# Patient Record
Sex: FEMALE | Race: OTHER
[De-identification: names, ages, dates, MRNs, and addresses within clinical notes are randomized per-mention and may not be internally consistent; named-entity substitution may affect disease eponyms.]

---

## 2019-04-16 ENCOUNTER — HOSPITAL ENCOUNTER (EMERGENCY)
Dept: HOSPITAL 89 - ER | Age: 24
Discharge: HOME | End: 2019-04-16
Payer: COMMERCIAL

## 2019-04-16 VITALS — SYSTOLIC BLOOD PRESSURE: 148 MMHG | DIASTOLIC BLOOD PRESSURE: 105 MMHG

## 2019-04-16 DIAGNOSIS — F91.9: Primary | ICD-10-CM

## 2019-04-16 LAB — PLATELET COUNT, AUTOMATED: 291 K/UL (ref 150–450)

## 2019-04-16 PROCEDURE — 82040 ASSAY OF SERUM ALBUMIN: CPT

## 2019-04-16 PROCEDURE — 85025 COMPLETE CBC W/AUTO DIFF WBC: CPT

## 2019-04-16 PROCEDURE — 84443 ASSAY THYROID STIM HORMONE: CPT

## 2019-04-16 PROCEDURE — 83735 ASSAY OF MAGNESIUM: CPT

## 2019-04-16 PROCEDURE — 84703 CHORIONIC GONADOTROPIN ASSAY: CPT

## 2019-04-16 PROCEDURE — 80320 DRUG SCREEN QUANTALCOHOLS: CPT

## 2019-04-16 PROCEDURE — 84075 ASSAY ALKALINE PHOSPHATASE: CPT

## 2019-04-16 PROCEDURE — 82565 ASSAY OF CREATININE: CPT

## 2019-04-16 PROCEDURE — 82374 ASSAY BLOOD CARBON DIOXIDE: CPT

## 2019-04-16 PROCEDURE — 99283 EMERGENCY DEPT VISIT LOW MDM: CPT

## 2019-04-16 PROCEDURE — 84295 ASSAY OF SERUM SODIUM: CPT

## 2019-04-16 PROCEDURE — 84460 ALANINE AMINO (ALT) (SGPT): CPT

## 2019-04-16 PROCEDURE — 82310 ASSAY OF CALCIUM: CPT

## 2019-04-16 PROCEDURE — 84520 ASSAY OF UREA NITROGEN: CPT

## 2019-04-16 PROCEDURE — 84450 TRANSFERASE (AST) (SGOT): CPT

## 2019-04-16 PROCEDURE — 80329 ANALGESICS NON-OPIOID 1 OR 2: CPT

## 2019-04-16 PROCEDURE — 84132 ASSAY OF SERUM POTASSIUM: CPT

## 2019-04-16 PROCEDURE — 84155 ASSAY OF PROTEIN SERUM: CPT

## 2019-04-16 PROCEDURE — 82435 ASSAY OF BLOOD CHLORIDE: CPT

## 2019-04-16 PROCEDURE — 82247 BILIRUBIN TOTAL: CPT

## 2019-04-16 PROCEDURE — 82947 ASSAY GLUCOSE BLOOD QUANT: CPT

## 2019-04-16 NOTE — ER REPORT
History and Physical


Time Seen By MD:  16:14


HPI/ROS


CHIEF COMPLAINT: Bad emotions





HISTORY OF PRESENT ILLNESS: Pt states that she has bad emotions each year at 


spring time, "I am angry, upset, anxious, frustrated and sad". States that no 


one understands her "they cant feel my emotions so how can they understand".  Pt


states she has had these feelings each year around spring time and they seem to 


go away afterwards. Pt states it has been going on for 3 years "ever since they 


put me on an antibiotic 3 yrs ago this has been happening to me". PT denies 


suicidal thoughts. Pt denies homicidal thoughts.  Pt states she used to be on an


antidepressent years ago but stopped it because it was not working.Pt does not 


know the name of the antidepressent. Pt states she does not see a therapist 


because she does not have money to due so. 





REVIEW OF SYSTEMS:


Constitutional: No fever, no chills.


Eyes: No discharge.


ENT: No sore throat, + chronic runny nose in spring


Cardiovascular: No chest pain, no palpitations.


Respiratory: No cough, no shortness of breath.


Gastrointestinal: No abdominal pain, no vomiting.


Genitourinary: No hematuria.


Musculoskeletal: No back pain.


Skin: No rashes.


Neurological: No headache.


Psych: +depressed, +anxious


Allergies:  


Coded Allergies:  


     No Known Drug Allergies (Unverified , 3/17/17)


Home Meds


No Active Prescriptions or Reported Meds


Past Medical/Surgical History


Pmhx: emotional disorder


Pshx: noncontribuitory


Smoking Status:  Never Smoker


Hx Alcohol Use:  Yes


Constitutional





Vital Sign - Last 24 Hours








 4/16/19 4/16/19 4/16/19 4/16/19





 16:26 16:26 16:33 17:03


 


Pulse 97  112 95


 


Resp 14   


 


B/P (MAP)  135/104 (114)  


 


Pulse Ox 91  95 97


 


O2 Delivery Room Air   





 4/16/19 4/16/19 4/16/19 





 17:08 17:38 17:44 


 


Pulse 87 92  


 


B/P (MAP)   148/105 (119) 


 


Pulse Ox 94 93  








Physical Exam


General Appearance: The patient is alert, has no immediate need for airway 


protection and no signs of toxicity.


Eyes: Pupils equal and round no pallor or injection, EOMI


ENT:  no pharyngeal erythema or exudates, Mucous membranes are moist


Respiratory: There are no retractions, lungs are clear to auscultation.


Cardiovascular: Regular rate and rhythm. pulses are equal and symmetrical


Gastrointestinal:  Abdomen is soft and non tender, no masses, bowel sounds 


normal, no guarding, no rigidity or rebound


Neurological: Cranial nerves II-XII grossly intact, no sensory or motor loss


Skin: Warm and dry, no rashes.


Musculoskeletal: Neck is supple non tender, no vertebral tenderness


Extremities are non tender, nonswollen and have full range of motion.


Psych: + anxious








DIFFERENTIAL DIAGNOSIS: After history and physical exam differential diagnosis 


was considered for bipolar, depression, mood disorder





Medical Decision Making


Data Points


Result Diagram:  


4/16/19 1647                                                                    


           4/16/19 1647





Laboratory





Hematology








Test


 4/16/19


00:00 4/16/19


16:47


 


Red Blood Count


 


 5.34 M/uL


(4.17-5.56)


 


Mean Corpuscular Volume


 


 92.2 fL


(80.0-96.0)


 


Mean Corpuscular Hemoglobin


 


 31.8 pg


(26.0-33.0)


 


Mean Corpuscular Hemoglobin


Concent 


 34.5 g/dL


(32.0-36.0)


 


Red Cell Distribution Width


 


 13.2 %


(11.5-14.5)


 


Mean Platelet Volume


 


 7.8 fL


(7.2-11.1)


 


Neutrophils (%) (Auto)


 


 76.0 %


(39.4-72.5)


 


Lymphocytes (%) (Auto)


 


 12.7 %


(17.6-49.6)


 


Monocytes (%) (Auto)


 


 10.8 %


(4.1-12.4)


 


Eosinophils (%) (Auto)


 


 0.1 %


(0.4-6.7)


 


Basophils (%) (Auto)


 


 0.4 %


(0.3-1.4)


 


Nucleated RBC Relative Count


(auto) 


 0.0 /100WBC 





 


Neutrophils # (Auto)


 


 8.2 K/uL


(2.0-7.4)


 


Lymphocytes # (Auto)


 


 1.4 K/uL


(1.3-3.6)


 


Monocytes # (Auto)


 


 1.2 K/uL


(0.3-1.0)


 


Eosinophils # (Auto)


 


 0.0 K/uL


(0.0-0.5)


 


Basophils # (Auto)


 


 0.0 K/uL


(0.0-0.1)


 


Nucleated RBC Absolute Count


(auto) 


 0.00 K/uL 





 


Sodium Level


 


 144 mmol/L


(137-145)


 


Potassium Level


 


 3.9 mmol/L


(3.5-5.0)


 


Chloride Level


 


 109 mmol/L


()


 


Carbon Dioxide Level


 


 22 mmol/L


(22-31)


 


Blood Urea Nitrogen


 


 12 mg/dl


(7-18)


 


Creatinine


 


 0.70 mg/dl


(0.52-1.04)


 


Glomerular Filtration Rate


Calc 


 > 60.0 





 


Random Glucose


 


 118 mg/dl


()


 


Calcium Level


 


 10.1 mg/dl


(8.4-10.2)


 


Magnesium Level


 


 2.1 mg/dl


(1.7-2.2)


 


Total Bilirubin


 


 0.7 mg/dl


(0.2-1.3)


 


Aspartate Amino Transf


(AST/SGOT) 


 20 U/L (0-35) 





 


Alanine Aminotransferase


(ALT/SGPT) 


 24 U/L (0-56) 





 


Alkaline Phosphatase  57 U/L (0-126) 


 


Total Protein


 


 8.4 g/dl


(6.3-8.2)


 


Albumin


 


 5.4 g/dl


(3.5-5.0)


 


Salicylates Level  < 10 mg/L 


 


Salicylate Last Dose Date  unknown 


 


Acetaminophen Level  < 10 ug/ml 


 


Serum Alcohol  < 10 mg/dl 








Chemistry








Test


 4/16/19


00:00 4/16/19


16:47


 


White Blood Count


 


 10.8 k/uL


(4.5-11.0)


 


Red Blood Count


 


 5.34 M/uL


(4.17-5.56)


 


Hemoglobin


 


 17.0 g/dL


(12.0-16.0)


 


Hematocrit


 


 49.3 %


(34.0-47.0)


 


Mean Corpuscular Volume


 


 92.2 fL


(80.0-96.0)


 


Mean Corpuscular Hemoglobin


 


 31.8 pg


(26.0-33.0)


 


Mean Corpuscular Hemoglobin


Concent 


 34.5 g/dL


(32.0-36.0)


 


Red Cell Distribution Width


 


 13.2 %


(11.5-14.5)


 


Platelet Count


 


 291 K/uL


(150-450)


 


Mean Platelet Volume


 


 7.8 fL


(7.2-11.1)


 


Neutrophils (%) (Auto)


 


 76.0 %


(39.4-72.5)


 


Lymphocytes (%) (Auto)


 


 12.7 %


(17.6-49.6)


 


Monocytes (%) (Auto)


 


 10.8 %


(4.1-12.4)


 


Eosinophils (%) (Auto)


 


 0.1 %


(0.4-6.7)


 


Basophils (%) (Auto)


 


 0.4 %


(0.3-1.4)


 


Nucleated RBC Relative Count


(auto) 


 0.0 /100WBC 





 


Neutrophils # (Auto)


 


 8.2 K/uL


(2.0-7.4)


 


Lymphocytes # (Auto)


 


 1.4 K/uL


(1.3-3.6)


 


Monocytes # (Auto)


 


 1.2 K/uL


(0.3-1.0)


 


Eosinophils # (Auto)


 


 0.0 K/uL


(0.0-0.5)


 


Basophils # (Auto)


 


 0.0 K/uL


(0.0-0.1)


 


Nucleated RBC Absolute Count


(auto) 


 0.00 K/uL 





 


Glomerular Filtration Rate


Calc 


 > 60.0 





 


Calcium Level


 


 10.1 mg/dl


(8.4-10.2)


 


Magnesium Level


 


 2.1 mg/dl


(1.7-2.2)


 


Total Bilirubin


 


 0.7 mg/dl


(0.2-1.3)


 


Aspartate Amino Transf


(AST/SGOT) 


 20 U/L (0-35) 





 


Alanine Aminotransferase


(ALT/SGPT) 


 24 U/L (0-56) 





 


Alkaline Phosphatase  57 U/L (0-126) 


 


Total Protein


 


 8.4 g/dl


(6.3-8.2)


 


Albumin


 


 5.4 g/dl


(3.5-5.0)


 


Salicylates Level  < 10 mg/L 


 


Salicylate Last Dose Date  unknown 


 


Acetaminophen Level  < 10 ug/ml 


 


Serum Alcohol  < 10 mg/dl 








Toxicology








Test


 4/16/19


16:47


 


Salicylates Level < 10 mg/L 


 


Salicylate Last Dose Date unknown 


 


Acetaminophen Level < 10 ug/ml 


 


Serum Alcohol < 10 mg/dl 








Urinalysis








Test


 4/16/19


00:00











ED Course/Re-evaluation


ED Course


check labs and discuss with S





 04/16/2019 5:45:50 pm Pt seen by [facility] and does not want to be admitted. Pt is 


not homicidal or suicidal.  PT given numbers for follow up with therapist as out


patient. 








 04/16/2019 6:00:55 pm PT requesting a numbner to call tomorrow to obtain her 


tsh levels.  I did let her know of the patient portal however will also give her


the emergency room numbner.


Decision to Disposition Date:  Apr 16, 2019


Decision to Disposition Time:  17:50





Depart


Departure


Latest Vital Signs





Vital Signs








  Date Time  Temp Pulse Resp B/P (MAP) Pulse Ox O2 Delivery O2 Flow Rate FiO2


 


4/16/19 17:44    148/105 (119)    


 


4/16/19 17:38  92   93   


 


4/16/19 16:26   14   Room Air  








Impression:  


   Primary Impression:  


   Behavioral disorder


Condition:  Condition Unchanged


Disposition:  HOME OR SELF-CARE


Referrals:  


CONSTANTIN PEREZ MD (PCP)











[facility]


5 Days


New Scripts


No Active Prescriptions or Reported Meds


Patient Instructions:  GENERAL ER DISCHARGE INSTRUCTIONS





Additional Instructions:  


It is important that you follow up with a therapist to help you with your 


emotions. 


If at anytime you feel unsafe and feel you need to be admitted to the behavioral


health unit then please return to the emergency department.  





Your thyroid study will not be back until tomorrow. We will call you if  your 


thyroid levels are abnormal.  Emergency room kei [phone]











TALISHA COLVIN DO            Apr 16, 2019 16:14

## 2019-04-17 ENCOUNTER — HOSPITAL ENCOUNTER (EMERGENCY)
Dept: HOSPITAL 89 - ER | Age: 24
Discharge: PSYCHIATRIC OR SUBSTANCE USE FACILITY | End: 2019-04-17
Payer: COMMERCIAL

## 2019-04-17 ENCOUNTER — HOSPITAL ENCOUNTER (INPATIENT)
Dept: HOSPITAL 89 - BHS | Age: 24
LOS: 6 days | Discharge: HOME | DRG: 885 | End: 2019-04-23
Attending: PSYCHIATRY & NEUROLOGY | Admitting: PSYCHIATRY & NEUROLOGY
Payer: COMMERCIAL

## 2019-04-17 VITALS — WEIGHT: 160 LBS | HEIGHT: 64 IN | BODY MASS INDEX: 27.31 KG/M2

## 2019-04-17 VITALS — SYSTOLIC BLOOD PRESSURE: 118 MMHG | DIASTOLIC BLOOD PRESSURE: 82 MMHG

## 2019-04-17 VITALS — SYSTOLIC BLOOD PRESSURE: 125 MMHG | DIASTOLIC BLOOD PRESSURE: 10 MMHG

## 2019-04-17 DIAGNOSIS — G47.00: ICD-10-CM

## 2019-04-17 DIAGNOSIS — F30.2: Primary | ICD-10-CM

## 2019-04-17 DIAGNOSIS — Z55.8: ICD-10-CM

## 2019-04-17 DIAGNOSIS — F22: ICD-10-CM

## 2019-04-17 DIAGNOSIS — R45.851: Primary | ICD-10-CM

## 2019-04-17 PROCEDURE — 81001 URINALYSIS AUTO W/SCOPE: CPT

## 2019-04-17 PROCEDURE — 80178 ASSAY OF LITHIUM: CPT

## 2019-04-17 PROCEDURE — 81025 URINE PREGNANCY TEST: CPT

## 2019-04-17 PROCEDURE — 99284 EMERGENCY DEPT VISIT MOD MDM: CPT

## 2019-04-17 PROCEDURE — 80305 DRUG TEST PRSMV DIR OPT OBS: CPT

## 2019-04-17 PROCEDURE — 36415 COLL VENOUS BLD VENIPUNCTURE: CPT

## 2019-04-17 SDOH — EDUCATIONAL SECURITY - EDUCATION ATTAINMENT: OTHER PROBLEMS RELATED TO EDUCATION AND LITERACY: Z55.8

## 2019-04-17 NOTE — ER REPORT
History and Physical


Time Seen By MD:  09:00


Hx. of Stated Complaint:  


STATES "I JUST DONT UNDERSTAND WHY I CANT SLEEP". SHE HAS HAD EMOTIONAL 


OUTBREAKS SINCE FEB THIS YEAR. SHE WANTS SOMEONE TO TALK THROUGH HER PAST 


BESIDES HER BOY FRIEND BUT SHE ALSO WANTS HER BOYFRIEND PRESENT FOR SUPPORT. 


STATES SHE HAS STARTED BEHAVIORAL MEDS BEFORE BUT STOPS THEM BECAUSE IT HURTS 


HER STOMACH.


HPI/ROS


CHIEF COMPLAINT: Feelings of persecution suicidal thoughts





HISTORY OF PRESENT ILLNESS: 23-year-old female returns emergency department was 


seen here yesterday for feelings of persecution feelings of anxiety depressive 


disorder comes back today now she is feeling persecuted by her teachers she has 


rambling tangential thought thoughts of suicide or self-harm and wanted to kill 


herself no thoughts of homicidal behaviors. Patient states that she foils like 


she's a standing [employer] and understands her teachers or persecuting her that 


the grade should not matter that she didn't have to pay for bad grades this as 


I'll plan to try to hurt her and that she feels that potentially when she feels 


like this at killing himself as he only option. Patient has had issues in the 


past has been on medication unclear the last time she is on medication as her 


ability to acquire history is somewhat limited by her current mental status. I 


did speak to our admitting psychiatric doctors that she had a copies of workup 


done yesterday we'll simply had a urine and a urine toxicology and have her 


admitted as hopefully voluntary if not we will due to suicidal ideation have her


put in as a title





REVIEW OF SYSTEMS:


Respiratory: No cough, no dyspnea.


Cardiovascular: No chest pain, no palpitations.


Gastrointestinal: No vomiting, no abdominal pain.


Musculoskeletal:


Remainder of the 14 system rev:  Yes


Allergies:  


Coded Allergies:  


     No Known Drug Allergies (Unverified , 4/17/19)


Home Meds


No Active Prescriptions or Reported Meds


Reviewed Nurses Notes:  Yes


Old Medical Records Reviewed:  Yes


Smoking Status:  Never Smoker


Hx Substance Use Disorder:  No


Hx Alcohol Use:  Yes


Constitutional





Vital Sign - Last 24 Hours








 4/17/19





 08:55


 


Temp 99.6


 


Pulse 83


 


Resp 20


 


B/P (MAP) 125/10


 


Pulse Ox 95


 


O2 Delivery Room Air








Physical Exam


  General Appearance: The patient is alert, has no immediate need for airway 


protection and no current signs of toxicity.  [ ]


Eyes: Pupils equal and round no injection.


Respiratory: Chest is non tender, lungs are clear to auscultation.


Cardiac: regular rate and rhythm [ ]


Gastrointestinal: Abdomen is soft and non tender, no masses, bowel sounds 


normal.


Musculoskeletal:  Neck: Neck is supple and non tender.


   Extremities have full range of motion and are non tender.


Skin: No rashes or lesions.


Behavioral examination patient having flights of ideas tend gentle thoughts 


feelings of persecution denies hearing voices patient feels that people are out 


to get her paranoid type schizophrenia behaviors suicidal ideation without plan 


self-harm ideation as well


DIFFERENTIAL DIAGNOSIS: After history and physical exam differential diagnosis 


was considered for paranoid schizophrenia and delusions feelings of persecution 


feelings of self-harm suicidal ideation





Medical Decision Making


ED Course/Re-evaluation


ED Course


Spoke to admitting M.D. on the psychiatric floor no further medical workup other


than urinalysis and urine tox of be required. Patient will be admitted to our 


psychiatric unit as a voluntary admission if not we will go ahead and do a tidal


due to suicidal ideation and thoughts of wanting to have self-harm patient be 


admitted to our behavioral health unit


Decision to Disposition Date:  Apr 17, 2019


Decision to Disposition Time:  09:28





Depart


Departure


Latest Vital Signs





Vital Signs








  Date Time  Temp Pulse Resp B/P (MAP) Pulse Ox O2 Delivery O2 Flow Rate FiO2


 


4/17/19 08:55 99.6 83 20 125/10 95 Room Air  








Impression:  


   Primary Impression:  


   Suicidal ideation


   Additional Impression:  


   Paranoid delusion


Condition:  Condition Unchanged


[facility]


New Scripts


No Active Prescriptions or Reported Meds





Problem Qualifiers











ALPHONSO TAYLOR MD           Apr 17, 2019 09:28

## 2019-04-17 NOTE — HISTORY AND PHYSICAL
DATE OF ADMISSION:  April 17, 2019 



IDENTIFYING INFORMATION 

Mariza Webster is a 23-year-old voluntary first admission to Behavioral Health.  



PRESENTING PROBLEMS AND CHIEF COMPLAINT  

Mariza presented to the Emergency Room initially on April 16th complaining of 

bad emotions, and "I'm angry, upset, anxious, frustrated, and sad."  She was 

seen in the Emergency Room, but released since she did not want to be admitted 

to Behavioral Health, and she did not appear to be suicidal or homicidal at that

time.  Mariza returned to the Emergency Room on the morning of 04/17/19 and 

was again assessed, but this time she was showing more agitation, and Dr. Taylor was able to convince her to be admitted to Behavioral Health.  



I first met with the patient on the morning of her admission, 04/17/19.  At this

time, she is extremely agitated and not even able to give a coherent history.  

She admits that she feels that there is something terribly wrong with her brain,

like it is "inflamed."  She admits to racing thoughts, terrible emotions, too 

many thoughts, and a fear of going insane.  She also admits that she was having 

thoughts about suicide when she came to the Emergency Room.  It was not possible

to get much coherent history from Mariza because of her confused thinking.  I 

was only able to determine that she had been in therapy in high school and that 

she has not been recently taking any psychiatric medications.  She denies any 

prior psychiatric hospitalizations or attempts at self-harm.  She did admit that

she has been using cannabis daily recently, hoping to calm herself down.  



We were able to get more psychiatric history from Mariza's boyfriend, Isaias, 

who accompanied her to the hospital.  He lives with Mariza and stated that her

symptoms started about a week ago.  She stopped sleeping and has been pacing 

relentlessly.  In the last several days, he has been alarmed by her inability to

even think clearly and has pressed her to come to the Emergency Room.  He 

confirms that she typically takes no psychiatric medications.  We also spoke 

with Mariza's mother by phone, who is on her way to [place] from her home in 

[place].  Mariza's mother said that while Mariza had some depression in 

high school and had therapy and took an antidepressant, she has never had 

anything like this before.  She has never been hospitalized for psychiatric care

or attempted suicide.  There is no family history of psychiatric illness.  



PAST MEDICAL HISTORY 

Also negative, and Mariza has only recently started smoking cigarettes.  



SOCIAL HISTORY 

I learned from Mariza's boyfriend and mother that she was born and raised in 

[place].  She has a degree in physics and in astrophysics and is pursuing a 

teaching certificate at .  



SUBSTANCE USE

Mariza has been using cannabis daily since the onset of these symptoms.  She 

denies other substances, and her boyfriend also says that she does not typically

use any other drugs.  



PHYSICAL EXAMINATION 

VITAL SIGNS:  On admission, Mariza's temperature was 99.6, pulse 83, 

respirations 20, blood pressure is 125/100, and pulse oximetry is 95%.  

She received a physical examination in the Emergency Room, which showed no acute

pathology.  Please see Dr. Taylor's note for further information. 



LABORATORY 

We repeated a serum hCG on admission to confirm that Mariza is not pregnant.  

Her urinalysis was positive for ketones.  Urine toxicology entirely negative 

with the exception of cannabinoids, consistent with her history of marijuana 

use.  CBC done in the Emergency Room yesterday showed normal WBC count, but a 

preponderance of neutrophils.  Hemoglobin was 17 and hematocrit 49.  Chemistries

showed a random glucose of 118, chloride 109, total protein high at 8.4, and 

albumin 5.4, but the other values were within normal limits.  TSH was 0.49.  



MENTAL STATUS EXAMINATION 

This morning, Mariza presents as a highly agitated young woman whose grooming 

is poor today.  She was dressed in hospital scrubs when we met.  Her attitude 

toward this examination was cooperative, but she was not able to give a coherent

history.  She is hyperalert and describes her mood as having "too many 

emotions."  Her affect is also intense with psychomotor agitation.  Speech is 

pressured.  Thought processes are confused and tangential.  She also affirms 

some thinking suggestive of psychosis including the feeling that she may be 

getting vague messages from "the DNA."  Intelligence is judged to be above 

average based on her history of an advanced degree, and I am encouraged by her 

realization that there is something terribly wrong in her willingness to 

participate in treatment and take medications.  



ASSESSMENT 

Mariza Webster is an intelligent young woman who is experiencing an acute 

manic episode characterized by nearly a week of insomnia, agitation, pressured 

speech, inability to concentrate, and now disorganized thinking.  She has a 

supportive relationship with her boyfriend who is here to assist her in getting 

treatment, and her parents are also coming to [place] to help, which is 

encouraging.  She is motivated to improve and willing to take medications.  



INITIAL PSYCHIATRIC DIAGNOSIS 

Bipolar I disorder with psychotic features, manic phase.



PLAN 

Our first priority is to help Mariza get some sleep.  She is admitted to 

Behavioral Health and placed on the necessary precautions.  I have ordered her 

Zyprexa 10 mg and Ativan 2 mg this afternoon.  I will continue this this evening

as tolerated.  Tomorrow, we will begin discussion of treatment with either 

lithium or Depakote.  We invited Mariza's parents and boyfriend to return on 

Friday for a treatment team meeting.  We will begin education with both Mariza

as well as her family about her illness and what we can do for it.  I estimate 

her length of stay will be about five to seven days.  She is currently 

voluntary.  

[facility]

## 2019-04-18 VITALS — DIASTOLIC BLOOD PRESSURE: 98 MMHG | SYSTOLIC BLOOD PRESSURE: 135 MMHG

## 2019-04-18 VITALS — DIASTOLIC BLOOD PRESSURE: 86 MMHG | SYSTOLIC BLOOD PRESSURE: 123 MMHG

## 2019-04-18 RX ADMIN — LITHIUM CARBONATE SCH MG: 300 CAPSULE, GELATIN COATED ORAL at 20:44

## 2019-04-18 RX ADMIN — LITHIUM CARBONATE SCH MG: 300 CAPSULE, GELATIN COATED ORAL at 09:51

## 2019-04-18 NOTE — BHS PROGRESS NOTE
BHS - Subjective


Progress Notes


Subjective


Mariza slept most of the afternoon and through the night yesterday after 10 


mgs of Zyprexa and 2 mgs of Ativan.  She feels more rested and her affect is 


much better today.  She has showered.  Thoughts are still racing and mood still 


highly dysphoric but she is better.  She is still tearful and preoccupied with 


school problems.  Mariza also admits to psychotic symptoms.  I asked her about


voices or other hallucinations, but she volunteered that she has been getting 


strange messages from teachers and seeing patterns in numbers.  This strikes her


as strange and troubling. 





Armando is ready to admit that she has a serious illness and feels that bipolar 


disorder is consistent with her symptoms.  She is anxious to take medication to 


get better and to keep this from happening again.  We talked about what to use. 


I suggested lithium carbonate and briefly reviewed the usual side effects.  She 


will start it today at 300 mgs twice daily while continuing Zyprexa 10 mgs at 


night routine to help in the short term.


Suicidal Ideation:  None


Homicidal Ideation:  None





BHS - Objective


Physical Exam


Vital Signs





Vital Signs








 4/18/19 4/18/19





 03:21 11:44


 


Temp  99.6


 


Pulse  85


 


Resp 15 


 


B/P (MAP)  135/98 (110)


 


Pulse Ox  95


 


O2 Delivery  Room Air








Muscle Strength and Tone:  WNL


Gait and Station:  Steady


[facility] Medications Reviewed:  Side Effects, Benefits of Medication, Risks


Allergies Reviewed:  Yes





Mental Status Exam


General Appearance:  Casual, Well Groomed, Good Eye Contact, Cooperative, 


Polite, Good Interaction, Psychomotor Agitation


Speech:  Clear, Spontaneous, Other (still pressured)


Mood:  Dysthmic/Depressed


Affect:  Sad, Tearful, Anxious, Agitated


Thought Process:  Flight of Ideas


Thought Content:  No Suicidal Ideation, No Homicidal Ideation; Other (seeing 


strange patterns in numbers)


Cognition:  Alert & Oriented-Person, Alert & Oriented-Place, Alert & Oriented-


Time, Alert-Oriented-Situation


Memory:  Immediate, Recent, Remote


Intelligence:  Above Average


Insight Judgment:  Good





BHS Assessment and Plan


Face-to-Face Encounter Date:  Apr 18, 2019


Face-to-Face Encounter Time:  09:15


BHS Plan:  Admit to Unit, Necessary Precautions, Individual/Group Therapy, 


Admin/Titrate Meds, Educate Patient


Problems:  


(1) Bipolar I disorder, single manic episode, severe with psychotic features


Condition


We are starting lithium this morning beginning with 300 mgs twice daily.  


Continue Zyprexa at hs.  Will have a treatment team meeting tomorrow with family


to discuss needs and diagnosis.











RUBEN VELASQUEZ DO               Apr 18, 2019 13:45

## 2019-04-19 VITALS — SYSTOLIC BLOOD PRESSURE: 125 MMHG | DIASTOLIC BLOOD PRESSURE: 83 MMHG

## 2019-04-19 RX ADMIN — LITHIUM CARBONATE SCH MG: 300 CAPSULE, GELATIN COATED ORAL at 08:19

## 2019-04-19 NOTE — NUR
At approximately 0600 PT came to the desk and requested a different reclining chair, due to 
the chair not staying reclined when she was sleeping in it,  this tech told the patient that 
all of the chairs did the same thing.  This tech asked the patient why she was not sleeping 
in the bed the patient told this tech that the bed was too soft.  The patient asked for a 
yoga mat to sleep on,  this tech gave her a yoga mat.

## 2019-04-19 NOTE — BHS PROGRESS NOTE
BHS - Subjective


Progress Notes


Subjective


Yesterday, we started lithium and I gave Mariza 5 mgs of Zyprexa at night.  


Today, she reports that she did not sleep well and staff confirms she was very 


restless.  Mood is highly anxious with persistent racing thoughts.  We met in 


treatment team this morning joined by Mariza's boyfriend, Isaias, as well as 


her parents Yolande and Kyle Esparza who have driven here from [place] for 


Mariza.  We discussed her diagnosis and our plan of care.  





Mariza is still seeing connections everywhere between things that are 


unrelated.  For example, she thought that because another patient mentioned the 


television program, [employer] of [employer]rones, that there was a specific reference to her 


personally relating to the TV show.  She is still preoccupied with with the 


biochemistry of her medications and their chemical properties with a general 


tendency to "over-think" every aspect of her environment,  Groups are difficult 


for her and she wants to get up and write on the board but goes in a different 


directions that is not relevant to the topic.  





We talked about lithium this morning focusing on it's use and common side 


effects as well as what to expect.  I indicated that we are using Zyprexa in the


short-term to help reduce her manic symptoms and help with sleep.


Suicidal Ideation:  None


Homicidal Ideation:  None





BHS - Objective


Physical Exam


Vital Signs





Vital Signs








 4/19/19





 04:44


 


Temp 99.1


 


Pulse 81


 


Resp 15


 


B/P (MAP) 125/83 (97)


 


Pulse Ox 95


 


O2 Delivery Room Air








Muscle Strength and Tone:  WNL


Gait and Station:  Steady


BHS Medications Reviewed:  Side Effects, Benefits of Medication, Risks


Allergies Reviewed:  Yes





Mental Status Exam


General Appearance:  Casual, Well Groomed, Good Eye Contact, Cooperative, P


olite, Good Interaction, Tearful, Psychomotor Agitation


Speech:  Clear, Spontaneous, Other (still pressured)


Mood:  Dysthmic/Depressed, Hyperthymic


Affect:  Sad, Tearful, Anxious, Agitated


Thought Process:  Loose Associations, Flight of Ideas


Thought Content:  No Suicidal Ideation, No Homicidal Ideation, No Auditory 


Halllucinations, No Visual Hallucinations; Ideas of Reference, Other (seeing 


connections between unrelated events)


Cognition:  Alert & Oriented-Person, Alert & Oriented-Place, Alert & 


Oriented-Time, Alert-Oriented-Situation


Memory:  Immediate, Recent, Remote


Intelligence:  Above Average


Insight Judgment:  Good





BHS Assessment and Plan


Face-to-Face Encounter Date:  Apr 19, 2019


Face-to-Face Encounter Time:  09:52


S Plan:  Admit to Unit, Necessary Precautions, Individual/Group Therapy, 


Admin/Titrate Meds, Educate Patient


Problems:  


(1) Bipolar I disorder, single manic episode, severe with psychotic features


Condition


I am advancing lithium to 300 mgs in the morning and at noon and 600 mgs at 


night beginning tonight.  


Increase Zyprexa to 10 mgs tonight hoping for better sleep and quicker reduction


in manic psychosis.


Will check a lithium level on Monday.











RUBEN VELASQUEZ DO               Apr 19, 2019 15:27

## 2019-04-20 VITALS — SYSTOLIC BLOOD PRESSURE: 144 MMHG | DIASTOLIC BLOOD PRESSURE: 103 MMHG

## 2019-04-20 RX ADMIN — LITHIUM CARBONATE SCH MG: 300 CAPSULE, GELATIN COATED ORAL at 20:50

## 2019-04-20 RX ADMIN — LITHIUM CARBONATE SCH MG: 300 CAPSULE, GELATIN COATED ORAL at 08:18

## 2019-04-20 RX ADMIN — LITHIUM CARBONATE SCH MG: 300 CAPSULE, GELATIN COATED ORAL at 12:02

## 2019-04-20 NOTE — BHS PROGRESS NOTE
BHS - Subjective


Progress Notes


Subjective


Armando continues to have pressured speech and some loose associations.  Staff 


reports that she was thinking her parents were in the office here on the [place].  


She admits that she is hearing voices of boyfriend and parents.  Did not sleep 


soundly last night.  Was up until midnight and restless through the night.  No 


problems so far with the lithium.  Still has racing thoughts and some flight of 


ideas, but is better today than yesterday.  Is very anxious to go home.  I 


encouraged Armando to be patient and to allow her brain time to heal.  Talked again


about the course of bipolar disorder and where we are in this.


Suicidal Ideation:  None


Homicidal Ideation:  None





BHS - Objective


Physical Exam


Vital Signs





Vital Signs








 4/19/19 4/20/19





 04:44 09:18


 


Temp  99.0


 


Pulse  104


 


Resp 15 


 


B/P (MAP)  144/103 (117)


 


Pulse Ox  96


 


O2 Delivery  Room Air








Muscle Strength and Tone:  WNL


Gait and Station:  Steady


BHS Medications Reviewed:  Side Effects, Benefits of Medication, Risks


Allergies Reviewed:  Yes





Mental Status Exam


General Appearance:  Casual, Well Groomed, Good Eye Contact, Cooperative, 


Polite, Good Interaction, Tearful, Psychomotor Agitation


Speech:  Clear, Spontaneous, Normal Rhythm, Normal Volume, Normal Tone, 


Rambling, Other (still pressured)


Mood:  Dysthmic/Depressed, Hyperthymic


Affect:  Anxious, Agitated (better), Other (increased)


Thought Process:  Loose Associations, Flight of Ideas


Thought Content:  No Suicidal Ideation, No Homicidal Ideation; Auditory 


Halllucinations; No Visual Hallucinations; Other (seeing connections between 


unrelated events)


Sensorium:  Clear


Cognition:  Alert & Oriented-Person, Alert & Oriented-Place, Alert & Oriented-


Time, Alert-Oriented-Situation


Memory:  Immediate, Recent, Remote


Intelligence:  Above Average


Insight Judgment:  Fair





BHS Assessment and Plan


Face-to-Face Encounter Date:  Apr 20, 2019


Face-to-Face Encounter Time:  10:20


BHS Plan:  Admit to Unit, Necessary Precautions, Individual/Group Therapy, 


Admin/Titrate Meds, Educate Patient


Problems:  


(1) Bipolar I disorder, single manic episode, severe with psychotic features


Assessment & Plan:  Continue to advance lithium dose and do a level on Monday.  


Increase Zyprexa to 12.5 mgs at bedtime.  





Condition


Armando is slowly improving but not yet ready for discharge.  We will continue to 


emphasize education about bipolar illness management.











RUBEN VELASQUEZ DO               Apr 20, 2019 10:39

## 2019-04-20 NOTE — NUR
REPORT FROM MARCELLE/FRANKLYN.  WILL ASSUME CARE OF PT AT THIS TIME.  PT IN ROOM, HAD HER BOYFRIEND 
VISIT HER.  C/O LEFT "KIDNEY PAIN".  DENIES PAIN OR PROBLEMS WITH URINATION.  PT JUST GOT 
OFF PHONE WITH BOYFRIEND ASKING HIM TO COME SEE HER, SHE "CANNOT SLEEP WITHOUT" HIM. STATED 
SHE WANTS TO "LAY" HER HEAD ON HIS LAP WITH HIM TALKING TO HER, TO HELP HER FALL ASLEEP.  
REMINDED PT SHE CAN NOT HAVE VISITORS AT THIS TIME.  SHE CONTINUES TO ASK BOYFRIEND TO COME. 
 PT TEARFUL AND TELLS HIM "I LOVE YOU", AND "I LOVE YOU MORE" MULTIPLE TIMES THROUGHOUT 
CONVERSATION.  PT RETURNED BACK TO ROOM TO REST.

## 2019-04-21 VITALS — DIASTOLIC BLOOD PRESSURE: 64 MMHG | SYSTOLIC BLOOD PRESSURE: 118 MMHG

## 2019-04-21 RX ADMIN — LITHIUM CARBONATE SCH MG: 300 CAPSULE, GELATIN COATED ORAL at 08:44

## 2019-04-21 RX ADMIN — PSYLLIUM HUSK SCH PACKET: 3.4 GRANULE ORAL at 17:17

## 2019-04-21 RX ADMIN — LITHIUM CARBONATE SCH MG: 300 CAPSULE, GELATIN COATED ORAL at 20:26

## 2019-04-21 NOTE — NUR
REPORT FROM MARCELLE/RN.  WILL ASSUME CARE OF PT AT THIS TIME.  PT IN OTHER TV ROOM, WATCHING 
TV.  CAME UP TO [facility] CRYING, STATES SHE IS READY TO GO HOME.  PT STATES SHE WANTS 
HER BOYFRIEND.  PSYCH TECH GIVING SNACKS AND PT TURNED AROUND AND GOT A SNACK.  STOPPED 
CRYING.

## 2019-04-21 NOTE — BHS PROGRESS NOTE
BHS - Subjective


Progress Notes


Subjective


"It feels like it isn't really Easter and I'm afraid I"ve been here longer than 


I thought it was...is it 2020? It is warm outside so I think it must be June 


already and I've been here longer than I thought." "I really want to see my 


boyfriend because I want to propose to him." Client is tearful at times. Thought


processed disorganized. She is taking medications as ordered. She denies 


suicidal thoughts. Reporting feeling anxious and missing her boyfriend.


Suicidal Ideation:  None


Homicidal Ideation:  None





BHS - Objective


Physical Exam


Vital Signs





Vital Signs








  Date Time  Temp Pulse Resp B/P (MAP) Pulse Ox O2 Delivery O2 Flow Rate FiO2


 


4/20/19 09:18 99.0 104  144/103 (117) 96 Room Air  


 


4/19/19 04:44   15     








Muscle Strength and Tone:  WNL


Gait and Station:  Steady


[facility] Medications Reviewed:  Side Effects, Benefits of Medication, Risks


Allergies Reviewed:  Yes





Mental Status Exam


General Appearance:  Casual, Well Groomed, Good Eye Contact, Cooperative, 


Polite, Good Interaction, Tearful, Psychomotor Agitation


Speech:  Clear, Spontaneous, Normal Rhythm, Normal Volume, Normal Tone, 


Rambling, Other (still pressured)


Mood:  Dysthmic/Depressed, Hyperthymic


Affect:  Sad, Anxious, Agitated (better), Other (increased)


Thought Process:  Loose Associations, Flight of Ideas


Thought Content:  No Suicidal Ideation, No Homicidal Ideation; Auditory 


Halllucinations; No Visual Hallucinations; Other (seeing connections between 


unrelated events)


Sensorium:  Clear


Cognition:  Alert & Oriented-Person, Alert & Oriented-Place; No Alert & 


Oriented-Time; Alert-Oriented-Situation


Memory:  Immediate, Recent, Remote


Intelligence:  Above Average


Insight Judgment:  Fair





BHS Assessment and Plan


Face-to-Face Encounter Date:  Apr 21, 2019


Face-to-Face Encounter Time:  09:30


BHS Plan:  Admit to Unit, Necessary Precautions, Individual/Group Therapy, 


Admin/Titrate Meds, Educate Patient


Problems:  


(1) Bipolar I disorder, single manic episode, severe with psychotic features


Condition


This is my first meeting with client. She was tearful at times with confused 


thoughts regarding time thinking that she had been here so long that it was now 


2020 and not in fact Easter day. She was concerned because she felt that I 


looked like an old boss named "Liyah" and she had to be redirected several times


to who I was. We will continue with current medication plan and will continue 


with discharge planning.











BLAYNE BALL NP             Apr 21, 2019 11:21

## 2019-04-21 NOTE — NUR
PT AGITATED AND WANTS TO GO HOME.  PT CALLED HER BOYFRIEND AND TOLD HIM TO COME PICK HER UP. 
 WILL GIVE NOC MEDS AT THIS TIME.  PT DID NOT HAVE HER NAME TAWANDA.  STATES SHE THREW IT 
AWAY.  WENT TO [employer] [facility] AND EMPTIED ALL ON FLOOR TO FIND IT.  COULD NOT FIND.  SHE ASKED IF 
SHE CAN HAVE ART SUPPLIES.  PSYCH TECH GAVE HER SUPPLIES AND SHE DUMPED THEM ON THE FLOOR 
AND DUMPED GLUE ON FLOOR.  ALL SUPPLIED WHERE TAKEN AWAY.  PT THEN TRIED TO CALL HER MOM.  
PHONE WAS BUSY AND SHE STARTED CRYING ASKING FOR HELP.  ADVISED PT TO CALL BACK, AND SHE 
SHOULD LAY DOWN AND TRY TO GET SOME REST AND LET THE MEDICATIONS WORK.  PT KNOCKING ON PTS 
DOORS, YELLING FOR "MAHENDRA". PT CURRENTLY IN ROOM.

## 2019-04-22 VITALS — DIASTOLIC BLOOD PRESSURE: 87 MMHG | SYSTOLIC BLOOD PRESSURE: 122 MMHG

## 2019-04-22 VITALS — SYSTOLIC BLOOD PRESSURE: 107 MMHG | DIASTOLIC BLOOD PRESSURE: 92 MMHG

## 2019-04-22 RX ADMIN — LITHIUM CARBONATE SCH MG: 300 CAPSULE, GELATIN COATED ORAL at 08:10

## 2019-04-22 RX ADMIN — PSYLLIUM HUSK SCH PACKET: 3.4 GRANULE ORAL at 08:10

## 2019-04-22 RX ADMIN — LITHIUM CARBONATE SCH MG: 300 CAPSULE, GELATIN COATED ORAL at 20:49

## 2019-04-22 NOTE — BHS PROGRESS NOTE
BHS - Subjective


Progress Notes


Subjective


Pt seen in treatment team meeting with her boyfriend Isaias and her mother 


present.  After discussion and sign-out from weekend provider CARLTON Christopher and 


Rashida Clemens and from nursing staff familiar with pt-- it is clear that pt is 


slowly getting better-- much improved from profound halie on admission.  But pt 


remains very ill-- was up in the night last night, agitated, threw the candy 


dish, psychotic, knocking on other patient's doors asking for Isaias.  Today 


tearful, labile, anxious, says that any other person's voice hurts her ears and 


causes her thoughts to rush, she feels overwhelmed and cannot tolerate the least


bit of stimulation.  Denies AH.  C/O very poor sleep last night-- apparently 


only got 2 hours.  Will give ativan 1 mg q HS along with zyprexa 15 mg standing 


dose.  Today is only her second full day of total lithium 1200 mg, and today's 


am level was 0.5.  Continue lithium 600 mg BID.


Suicidal Ideation:  None


Homicidal Ideation:  None





BHS - Objective


Physical Exam


Vital Signs





Vital Signs








 4/22/19





 10:59


 


Temp 100.1


 


Pulse 100


 


Resp 18


 


B/P (MAP) 122/87 (99)


 


Pulse Ox 94


 


O2 Delivery Room Air








Muscle Strength and Tone:  WNL


Gait and Station:  Steady


[facility] Medications Reviewed:  Side Effects, Benefits of Medication, Risks


Allergies Reviewed:  Yes





Mental Status Exam


General Appearance:  Casual, Cooperative, Polite, Tearful, Psychomotor Agitation


Speech:  Clear, Spontaneous, Normal Rhythm, Normal Volume, Normal Tone, Rambling


Mood:  Dysthmic/Depressed, Hyperthymic


Affect:  Sad, Anxious, Agitated (mildly)


Thought Process:  Loose Associations


Thought Content:  No Suicidal Ideation, No Homicidal Ideation; Delusions; No 


Auditory Halllucinations, No Visual Hallucinations, No Thought Broadcasting, No 


Ideas of Reference, No Obsessions, No Compulsions; Other (seeing connections 


between unrelated events)


Sensorium:  Clear


Cognition:  Alert & Oriented-Person, Alert & Oriented-Place; No Alert & 


Oriented-Time; Alert-Oriented-Situation


Memory:  Immediate, Recent, Remote


Intelligence:  Above Average


Insight Judgment:  Fair





BHS Assessment and Plan


Face-to-Face Encounter Date:  Apr 22, 2019


Face-to-Face Encounter Time:  10:30


BHS Plan:  Admit to Unit, Necessary Precautions, Individual/Group Therapy, 


Admin/Titrate Meds, Educate Patient


Tobacco Medications:  Not Appropriate Condition


Multpiple Antipsychotics Used:  No


Problems:  


(1) Bipolar I disorder, single manic episode, severe with psychotic features











SUGEY BARROS MD                 Apr 22, 2019 15:13

## 2019-04-22 NOTE — NUR
PT OUT OF ROOM AT [facility]URSES ST DEMANDING A "TALL GLASS OF WATER", PT WITH RAISED VOICE, 
DEMANDED "NOW" GRABBED THE CANDY CUP AND THREW IT.  PT STATES SHE ALSO WANTED A "SWEATSHIRT" 
TO WEAR.  LONG SLEEVE JACKET GIVEN TO PT.  ROSA MARIA WAS ASKING FOR METAMUCIL BECAUSE SHE HAS NOT 
HAD A BM IN A WHILE.  REASSURED PT SHE WILL GET THE MEDICATION AT 0900.  SHE ALSO REQUESTED 
A ICE PACK. ROSA MARIA KNOCKED ON A PT'S DOOR AGAIN, LOOKING FOR MAHENDRA.  PT IS MUCH MORE AGITATED 
AT THIS TIME, CALLED NP AND GOT ORDER FOR ATIVAN 1MG Q6 PRN. ANOTHER PT CAME OUT OF THE ROOM 
STATING THAT SHE SEES "BANSHEE" IN HER ROOM AND ROSA MARIA REPLIED BACK TO HER THAT SHE "SEES THEM 
TOO"

## 2019-04-22 NOTE — NUR
REPORT FROM GORDO/RN.  WILL ASSUME CARE OF PT AT THIS TIME.  AT SHIFT CHANGE PT'S BOYFRIEND 
WAS WITH HER.  SO IS NOW ISOLATING IN ROOM, LAYING DOWN.  NO DISTRESS NOTED AT THIS TIME.

## 2019-04-22 NOTE — NUR
Pt mother came to nurses desk asking what she was given today "because she is dizzy and 
feeling foggy" This nurse stated She was given Ativan 1mg. Why? Because she became agitated 
in group. Why was she in group, I didn't want her to be in group because it overstimulates 
her. This nurse stated I did not know I was not here this afternoon when this happened. Mom 
stated that she was upset with the therapist having her go to group, even though she told 
the therapist she wouldn't like her daughter to be in group. The therapist countered with 
your daughter will have to learn how deal with groups as she's a university student. The mom 
was upset by this and told us it was like an, "in your face" moment. Mom said, "I am not 
happy with the care here and I already contacted the [facility] in [place] where we live 
to see if we can get a second opinion because we are not happy with the care here. I left a 
message for a couple of doctors and talked to a triage nurse on the phone. I'm just waiting 
for the doctor to call back now and for a doc to doc to occur." This was discussed with the 
Therapist shortly after having the conversation with the mom. Therapist stated she was 
invited to group and chose on her own accord to go to group". Mom stated I don't like Ativan 
and I don't want it given to her except at night.

## 2019-04-23 VITALS — SYSTOLIC BLOOD PRESSURE: 109 MMHG | DIASTOLIC BLOOD PRESSURE: 88 MMHG

## 2019-04-23 RX ADMIN — PSYLLIUM HUSK SCH PACKET: 3.4 GRANULE ORAL at 08:31

## 2019-04-23 RX ADMIN — LITHIUM CARBONATE SCH MG: 300 CAPSULE, GELATIN COATED ORAL at 08:31

## 2019-04-24 NOTE — BHS DISCHARGE SUMMARY
BHS Discharge Summary


Face-to-Face Encounter Date:  Apr 23, 2019


Face-to-Face Encounter Time:  08:30


Reason-Hosp/Final Diag (DSM-V):  


(1) Bipolar I disorder, single manic episode, severe with psychotic features


Hospital Course & Plan:  DATE OF ADMISSION:  April 17, 2019 





IDENTIFYING INFORMATION 


Mariza Webster is a 23-year-old voluntary first admission to Behavioral Health.  





PRESENTING PROBLEMS AND CHIEF COMPLAINT  


Mariza presented to the Emergency Room initially on April 16th complaining of 


bad emotions, and "I'm angry, upset, anxious, frustrated, and sad."  She was 


seen in the Emergency Room, but released since she did not want to be admitted 


to Behavioral Health, and she did not appear to be suicidal or homicidal at that


time.  Mariza returned to the Emergency Room on the morning of 04/17/19 and 


was again assessed, but this time she was showing more agitation, and Dr. Taylor was able to convince her to be admitted to Behavioral Health.  





I first met with the patient on the morning of her admission, 04/17/19.  At this


time, she is extremely agitated and not even able to give a coherent history.  


She admits that she feels that there is something terribly wrong with her brain,


like it is "inflamed."  She admits to racing thoughts, terrible emotions, too 


many thoughts, and a fear of going insane.  She also admits that she was having 


thoughts about suicide when she came to the Emergency Room.  It was not possible


to get much coherent history from Mariza because of her confused thinking.  I 


was only able to determine that she had been in therapy in high school and that 


she has not been recently taking any psychiatric medications.  She denies any 


prior psychiatric hospitalizations or attempts at self-harm.  She did admit that


she has been using cannabis daily recently, hoping to calm herself down.  





We were able to get more psychiatric history from Mariza's boyfriend, Isaias, 


who accompanied her to the hospital.  He lives with Mariza and stated that her


symptoms started about a week ago.  She stopped sleeping and has been pacing 


relentlessly.  In the last several days, he has been alarmed by her inability to


even think clearly and has pressed her to come to the Emergency Room.  He 


confirms that she typically takes no psychiatric medications.  We also spoke 


with Mariza's mother by phone, who is on her way to [place] from her home in 


[place].  Mariza's mother said that while Mariza had some depression in 


high school and had therapy and took an antidepressant, she has never had a


nything like this before.  She has never been hospitalized for psychiatric care 


or attempted suicide.  There is no family history of psychiatric illness.  





PAST MEDICAL HISTORY 


Also negative, and Mariza has only recently started smoking cigarettes.  





SOCIAL HISTORY 


I learned from Mariza's boyfriend and mother that she was born and raised in 


[place].  She has a degree in physics and in astrophysics and is pursuing a 


teaching certificate at .  





SUBSTANCE USE


Mariza has been using cannabis daily since the onset of these symptoms.  She 


denies other substances, and her boyfriend also says that she does not typically


use any other drugs.  





PHYSICAL EXAMINATION 


VITAL SIGNS:  On admission, Mariza's temperature was 99.6, pulse 83, 


respirations 20, blood pressure is 125/100, and pulse oximetry is 95%.  


She received a physical examination in the Emergency Room, which showed no acute


pathology.  Please see Dr. Taylor's note for further information. 





LABORATORY 


We repeated a serum hCG on admission to confirm that Mariza is not pregnant.  


Her urinalysis was positive for ketones.  Urine toxicology entirely negative 


with the exception of cannabinoids, consistent with her history of marijuana 


use.  CBC done in the Emergency Room yesterday showed normal WBC count, but a 


preponderance of neutrophils.  Hemoglobin was 17 and hematocrit 49.  Chemistries


showed a random glucose of 118, chloride 109, total protein high at 8.4, and 


albumin 5.4, but the other values were within normal limits.  TSH was 0.49.  





MENTAL STATUS EXAMINATION 


This morning, Mariza presents as a highly agitated young woman whose grooming 


is poor today.  She was dressed in hospital scrubs when we met.  Her attitude 


toward this examination was cooperative, but she was not able to give a coherent


history.  She is hyperalert and describes her mood as having "too many 


emotions."  Her affect is also intense with psychomotor agitation.  Speech is 


pressured.  Thought processes are confused and tangential.  She also affirms so


me thinking suggestive of psychosis including the feeling that she may be 


getting vague messages from "the DNA."  Intelligence is judged to be above 


average based on her history of an advanced degree, and I am encouraged by her 


realization that there is something terribly wrong in her willingness to 


participate in treatment and take medications.  





ASSESSMENT 


Mariza Webster is an intelligent young woman who is experiencing an acute 


manic episode characterized by nearly a week of insomnia, agitation, pressured 


speech, inability to concentrate, and now disorganized thinking.  She has a 


supportive relationship with her boyfriend who is here to assist her in getting 


treatment, and her parents are also coming to [place] to help, which is 


encouraging.  She is motivated to improve and willing to take medications.  





INITIAL PSYCHIATRIC DIAGNOSIS 


Bipolar I disorder with psychotic features, manic phase.





PLAN 


Our first priority is to help Mariza get some sleep.  She is admitted to 


Behavioral Health and placed on the necessary precautions.  I have ordered her 


Zyprexa 10 mg and Ativan 2 mg this afternoon.  I will continue this this evening


as tolerated.  Tomorrow, we will begin discussion of treatment with either 


lithium or Depakote.  We invited Mariza's parents and boyfriend to return on 


Friday for a treatment team meeting.  We will begin education with both Mariza


as well as her family about her illness and what we can do for it.  I estimate h


er length of stay will be about five to seven days.  She is currently voluntary.


 





HOSPITAL COURSE


Pt was admitted to [facility].  After sleeping well her first night she was calmer and 


able to focus better the second day.  Mood stabilizers were discussed and she 


agreed to start lithium 300 BID, and zyprexa 5 mg q HS.  The second night her 


sleep was still poor and she still had racing thoughts, pressure of speech, and 


psychotic referential thinking, so zyprexa was increased to 10 mg q hs.  Lithium


was well tolerated and was titrated up to 600 mg BID, with level AFTER 2 FULL 


DAYS (so not yet steady state) of 0.5.  Ultimately pt's meds were zyprexa 15 mg 


q hs and lithium 600 mg BID, and these were effective and well tolerated.  Pt's 


parents both drove out from their home in [place] and met with team several 


times, and pt's boyfriend also attended all treatment team meetings.  Pt 


improved bit by bit each day, with improved sleep, concentration, and thought 


process became more logical and goal-directed.  Pt's mother through most of pt's


stay had expressed desire to bring pt back home to [facility] (and pt was in


full agreement), where she could stay at home and follow-up with her primary 


care there and get referral to psychiatry, but she and family and pt all agreed 


to wait till pt safe for discharge before traveling home.  By Tuesday 4/23/19 pt


was stabilized to the point of safely leaving hospital.  She and mother and I 


discussed Follow up.  I called and spoke to DR. Peterson at the  Clinic and she 


scheduled pt for out patient primary care appointment with appointment time to 


be delivered through her pt portal.  Dr. Peterson will also send her a psychiatry 


appointment through the portal.  Mother understands back up plan if she feels pt


is decompensating is to go to psychiatric emergency room, (or nearest emergency 


room).  I gave them my cell phone number if they have any questions until they 


get back home and meet with new psychiatrist.  We discussed use of prn ativan 1 


mg for anxiety, and prn zyprexa 5 mg for agitation.  Mother and boyfriend will 


be with pt at all times to ensure safety.  Pt was discharged in improved co


ndition on 4/23/19.





Physical Exam


Latest Vital Signs





Vital Signs








 4/22/19 4/23/19





 10:59 05:37


 


Temp  99.0


 


Pulse  108


 


Resp 18 


 


B/P (MAP)  109/88 (95)


 


Pulse Ox  95


 


O2 Delivery  Room Air











Mental Status Exam


General Appearance:  Casual, Well Groomed, Good Eye Contact, Cooperative, 


Polite, Good Interaction


Speech:  Clear, Spontaneous, Normal Rate (still rapid but much improved over 


admission), Normal Rhythm, Normal Volume, Normal Tone


Mood:  Dysthmic/Depressed, Other (anxious about getting discharged, wants to go 


home)


Affect:  Sad, Anxious


Thought Process:  Other (circumstantial overall; is able to be logical and goal 


directed to closed-ended questions)


Thought Content:  No Suicidal Ideation, No Homicidal Ideation, No Delusions, No 


Auditory Halllucinations, No Visual Hallucinations, No Thought Broadcasting, No 


Ideas of Reference, No Obsessions, No Compulsions


Sensorium:  Clear


Cognition:  Alert & Oriented-Person, Alert & Oriented-Place, Alert & Oriented-


Time, Alert-Oriented-Situation


Memory:  Immediate, Recent, Remote


Intelligence:  Above Average


Insight Judgment:  Fair





Departure











Item Value  Date Time


 


White Blood Count 10.8 k/uL 4/16/19 1647


 


Red Blood Count 5.34 M/uL 4/16/19 1647


 


Hemoglobin 17.0 g/dL H 4/16/19 1647


 


Hematocrit 49.3 % H 4/16/19 1647


 


Mean Corpuscular Volume 92.2 fL 4/16/19 1647


 


Mean Corpuscular Hemoglobin 31.8 pg 4/16/19 1647


 


Mean Corpuscular Hemoglobin Concent 34.5 g/dL 4/16/19 1647


 


Red Cell Distribution Width 13.2 % 4/16/19 1647


 


Platelet Count 291 K/uL 4/16/19 1647


 


Sodium Level 144 mmol/L 4/16/19 1647


 


Potassium Level 3.9 mmol/L 4/16/19 1647


 


Chloride Level 109 mmol/L H 4/16/19 1647


 


Carbon Dioxide Level 22 mmol/L 4/16/19 1647


 


Blood Urea Nitrogen 12 mg/dl 4/16/19 1647


 


Creatinine 0.70 mg/dl 4/16/19 1647


 


Glomerular Filtration Rate Calc > 60.0 4/16/19 1647


 


Random Glucose 118 mg/dl H 4/16/19 1647


 


Calcium Level 10.1 mg/dl 4/16/19 1647


 


Magnesium Level 2.1 mg/dl 4/16/19 1647


 


Total Bilirubin 0.7 mg/dl 4/16/19 1647


 


Aspartate Amino Transf (AST/SGOT) 20 U/L 4/16/19 1647


 


Alanine Aminotransferase (ALT/SGPT) 24 U/L 4/16/19 1647


 


Alkaline Phosphatase 57 U/L 4/16/19 1647


 


Total Protein 8.4 g/dl H 4/16/19 1647


 


Albumin 5.4 g/dl H 4/16/19 1647


 


Thyroid Stimulating Hormone (TSH) 0.49 uIU/ml 4/16/19 1647


 


Human Chorionic Gonadotropin, Qual Negative 4/16/19 1647


 


Urine Color Straw 4/17/19 1033


 


Urine Clarity Clear 4/17/19 1033


 


Urine pH 7.0 pH 4/17/19 1033


 


Urine Specific Gravity 1.002 4/17/19 1033


 


Urine Protein Negative mg/dL 4/17/19 1033


 


Urine Glucose (UA) Negative mg/dL 4/17/19 1033


 


Urine Ketones 20 mg/dL H 4/17/19 1033


 


Urine Blood Negative 4/17/19 1033


 


Urine Nitrite Negative 4/17/19 1033


 


Urine Bilirubin Negative 4/17/19 1033


 


Urine Urobilinogen Negative mg/dL 4/17/19 1033


 


Urine Leukocyte Esterase Negative 4/17/19 1033


 


Urine RBC None /HPF 4/17/19 1033


 


Urine WBC <1 /HPF 4/17/19 1033


 


Urine Squamous Epithelial Cells None /LPF 4/17/19 1033


 


Urine Bacteria Negative /HPF 4/17/19 1033


 


Urine Mucus None /HPF 4/17/19 1033


 


Urine HCG, Qualitative Negative 4/17/19 0000


 


Salicylates Level < 10 mg/L 4/16/19 1647


 


Salicylate Last Dose Date unknown 4/16/19 1647


 


Urine Opiates Screen Negative 4/17/19 1033


 


Acetaminophen Level < 10 ug/ml 4/16/19 1647


 


Urine Barbiturates Screen Negative 4/17/19 1033


 


Ur Tricyclic Antidepressants Screen Negative 4/17/19 1033


 


Urine Phencyclidine Screen Negative 4/17/19 1033


 


Urine Amphetamines Screen Negative 4/17/19 1033


 


Urine Benzodiazepines Screen Negative 4/17/19 1033


 


Lithium Level 0.5 mmol/L L 4/22/19 0635


 


Urine Cocaine Screen Negative 4/17/19 1033


 


Urine Cannabinoids Screen Positive 4/17/19 1033


 


Serum Alcohol < 10 mg/dl 4/16/19 1647








Condition:  Improved


Discharge to:  Home





Discharge Instructions


Home Meds


Reported Medications


Olanzapine (ZYPREXA) 5 Mg Tablet, 5 MG PO Q6H PRN for AGITATION


   4/23/19


Lorazepam (ATIVAN) 1 Mg Tablet, 1 MG PO Q6H PRN for ANXIETY


   4/23/19


Olanzapine (ZYPREXA) 15 Mg Tablet, 15 MG PO QHS


   4/23/19


Psyllium Husk/Aspartame (METAMUCIL FIBER SINGLES PACKET) 3.4 Gm Powd.pack, 3.4 


GM PO QDAY


   4/23/19


Lithium Carbonate (LITHIUM CARBONATE) 300 Mg Tablet, 600 MG PO BID


   4/23/19


Norethindrone (NORETHINDRONE) 0.35 Mg Tablet, 1 TAB PO QDAY


   4/17/19


Multpiple Antipsychotics Used:  No


Diet:  Regular


Activity:  As Tolerated


Special Instructions:  


Take medications as prescribed. Follow up with [facility]. Follow up with  


outpatient therapy. Call Crisis Line should symptoms return











SUGEY BARROS MD                 Apr 24, 2019 16:53